# Patient Record
Sex: MALE | URBAN - METROPOLITAN AREA
[De-identification: names, ages, dates, MRNs, and addresses within clinical notes are randomized per-mention and may not be internally consistent; named-entity substitution may affect disease eponyms.]

---

## 2024-01-11 ENCOUNTER — OFFICE VISIT (OUTPATIENT)
Dept: URBAN - METROPOLITAN AREA CLINIC 115 | Facility: CLINIC | Age: 54
End: 2024-01-11

## 2024-01-11 NOTE — HPI-TODAY'S VISIT:
54 y/o M with PMH of  in clinic for screening colonoscopy. Denies PMH of MI, stroke, asthma, COPD, unstable angina, pacemaker, defibrillator, use of blood thinners/ASA. Denies abdominal pain, n/v, diarrhea, constipation, dysphagia, odynophagia, heartburn, blood in stool/vomit, unintentional weight loss, change in appetite, early satiety. Current BM frequency: Current BM consistency:  Denies FHx of GI Crystal, IBD. Denies NSAID use, EtOH/tobacco/marijuana use. Previous abdominal surgeries include:   Last colonoscopy date:

## 2025-05-15 ENCOUNTER — LAB OUTSIDE AN ENCOUNTER (OUTPATIENT)
Dept: URBAN - METROPOLITAN AREA SURGERY CENTER 13 | Facility: SURGERY CENTER | Age: 55
End: 2025-05-15

## 2025-05-20 ENCOUNTER — CLAIMS CREATED FROM THE CLAIM WINDOW (OUTPATIENT)
Dept: URBAN - METROPOLITAN AREA SURGERY CENTER 13 | Facility: SURGERY CENTER | Age: 55
End: 2025-05-20
Payer: COMMERCIAL

## 2025-05-20 DIAGNOSIS — Z12.11 COLON CANCER SCREENING: ICD-10-CM

## 2025-05-20 PROCEDURE — 00812 ANES LWR INTST SCR COLSC: CPT | Performed by: ANESTHESIOLOGIST ASSISTANT

## 2025-05-20 PROCEDURE — G0121 COLON CA SCRN NOT HI RSK IND: HCPCS | Performed by: STUDENT IN AN ORGANIZED HEALTH CARE EDUCATION/TRAINING PROGRAM

## 2025-05-20 PROCEDURE — 0528F RCMND FLW-UP 10 YRS DOCD: CPT | Performed by: STUDENT IN AN ORGANIZED HEALTH CARE EDUCATION/TRAINING PROGRAM

## 2025-05-20 PROCEDURE — 00812 ANES LWR INTST SCR COLSC: CPT | Performed by: CASE MANAGER/CARE COORDINATOR
